# Patient Record
Sex: FEMALE | Race: WHITE | NOT HISPANIC OR LATINO | URBAN - METROPOLITAN AREA
[De-identification: names, ages, dates, MRNs, and addresses within clinical notes are randomized per-mention and may not be internally consistent; named-entity substitution may affect disease eponyms.]

---

## 2017-02-28 ENCOUNTER — TELEPHONE (OUTPATIENT)
Dept: FAMILY MEDICINE | Facility: CLINIC | Age: 32
End: 2017-02-28

## 2017-02-28 NOTE — TELEPHONE ENCOUNTER
Freda reports that she has been having lower left abdominal pain for about a month. Pain score of a 4. Sleeps at night. Frequent urination started last night. Denies fever. Denies pain upon urination. Feels like she is not emptying her bladder completely. Denies lower back pain or side pain. Appointment made for tomorrow morning.   Candelario Garcia RN

## 2017-02-28 NOTE — TELEPHONE ENCOUNTER
Freda states that she has been having ovarian pain x 1 month.  She states that she had a cyst last fall ruputured and it could be something like that ?  She states that she has some concerns as she has been having frequency of urine and a lot more gas.  She has had some changes also to her bowel movements.  Please call and assess. Thank you..Brie Agee

## 2017-03-01 ENCOUNTER — OFFICE VISIT (OUTPATIENT)
Dept: FAMILY MEDICINE | Facility: CLINIC | Age: 32
End: 2017-03-01

## 2017-03-01 VITALS
HEART RATE: 81 BPM | BODY MASS INDEX: 30.66 KG/M2 | HEIGHT: 62 IN | WEIGHT: 166.6 LBS | TEMPERATURE: 98.3 F | SYSTOLIC BLOOD PRESSURE: 115 MMHG | DIASTOLIC BLOOD PRESSURE: 78 MMHG

## 2017-03-01 DIAGNOSIS — R30.0 DYSURIA: ICD-10-CM

## 2017-03-01 DIAGNOSIS — R87.810 CERVICAL HIGH RISK HPV (HUMAN PAPILLOMAVIRUS) TEST POSITIVE: ICD-10-CM

## 2017-03-01 DIAGNOSIS — B96.89 BV (BACTERIAL VAGINOSIS): ICD-10-CM

## 2017-03-01 DIAGNOSIS — K59.00 CONSTIPATION, UNSPECIFIED CONSTIPATION TYPE: ICD-10-CM

## 2017-03-01 DIAGNOSIS — R10.32 LLQ ABDOMINAL PAIN: ICD-10-CM

## 2017-03-01 DIAGNOSIS — R10.2 PELVIC PAIN IN FEMALE: Primary | ICD-10-CM

## 2017-03-01 DIAGNOSIS — N76.0 BV (BACTERIAL VAGINOSIS): ICD-10-CM

## 2017-03-01 DIAGNOSIS — R10.31 ABDOMINAL PAIN, RIGHT LOWER QUADRANT: ICD-10-CM

## 2017-03-01 LAB
ALBUMIN UR-MCNC: NEGATIVE MG/DL
APPEARANCE UR: CLEAR
BACTERIA #/AREA URNS HPF: ABNORMAL /HPF
BILIRUB UR QL STRIP: NEGATIVE
COLOR UR AUTO: YELLOW
GLUCOSE UR STRIP-MCNC: NEGATIVE MG/DL
HGB UR QL STRIP: ABNORMAL
KETONES UR STRIP-MCNC: NEGATIVE MG/DL
LEUKOCYTE ESTERASE UR QL STRIP: ABNORMAL
NITRATE UR QL: NEGATIVE
NON-SQ EPI CELLS #/AREA URNS LPF: ABNORMAL /LPF
PH UR STRIP: 6 PH (ref 5–7)
RBC #/AREA URNS AUTO: ABNORMAL /HPF (ref 0–2)
SP GR UR STRIP: 1.02 (ref 1–1.03)
URN SPEC COLLECT METH UR: ABNORMAL
URNS CMNT MICRO: ABNORMAL
UROBILINOGEN UR STRIP-ACNC: 0.2 EU/DL (ref 0.2–1)
WBC #/AREA URNS AUTO: ABNORMAL /HPF (ref 0–2)

## 2017-03-01 PROCEDURE — 99213 OFFICE O/P EST LOW 20 MIN: CPT | Performed by: PHYSICIAN ASSISTANT

## 2017-03-01 PROCEDURE — 88175 CYTOPATH C/V AUTO FLUID REDO: CPT | Performed by: PHYSICIAN ASSISTANT

## 2017-03-01 PROCEDURE — 87086 URINE CULTURE/COLONY COUNT: CPT | Performed by: PHYSICIAN ASSISTANT

## 2017-03-01 PROCEDURE — 81001 URINALYSIS AUTO W/SCOPE: CPT | Performed by: PHYSICIAN ASSISTANT

## 2017-03-01 PROCEDURE — 87624 HPV HI-RISK TYP POOLED RSLT: CPT | Performed by: PHYSICIAN ASSISTANT

## 2017-03-01 RX ORDER — METRONIDAZOLE 500 MG/1
500 TABLET ORAL 2 TIMES DAILY
Qty: 14 TABLET | Refills: 0 | Status: SHIPPED | OUTPATIENT
Start: 2017-03-01 | End: 2017-07-28

## 2017-03-01 NOTE — MR AVS SNAPSHOT
After Visit Summary   3/1/2017    Freda Hurley    MRN: [id number]           Patient Information     Date Of Birth          1985        Visit Information        Provider Department      3/1/2017 8:20 AM Fang Suarez PA-C [facility]        Today's Diagnoses     Dysuria    -  1    Cervical high risk HPV (human papillomavirus) test positive        Abdominal pain, right lower quadrant        LLQ abdominal pain        Pelvic pain in female        BV (bacterial vaginosis)          Care Instructions    Be seen if symptoms worsen or not improving, urgently if severe abdominal pain / fevers / vomiting / etc    Treat bacterial vaginosis - metronidazole    For constipation:   1. Consume at least 8 glasses of water per day   2. Exercise for at least 30 minutes every day. Regular exercise helps to keep your digestive system active and and healthy and may help with constipation.   3. Increase dietary fiber. Goal of 25 grams per day for women, 35 grams per day for men. If unable to consume 25-35 grams through diet alone consider OTC supplements such as Benefiber, FiberCon, Metamucil, or Citrucel.   4. Recommend taking Miralax (17 grams = 1 scoop). Take once daily, can mix with anything. If you experience increasing bowel movements and diarrhea, decrease to every other day or every 3rd day. Miralax is an osmotic laxative that increases the amount of water secreted by the intestines resulting in softer and easier to pass stools.   5. . If you are still having difficulties with constipation may also add a stool softener to your bowel regimen such a Docusate.           Follow-ups after your visit        Future tests that were ordered for you today     Open Future Orders        Priority Expected Expires Ordered    US Pelvic Complete w Transvaginal Routine 5/30/2017 3/1/2018 3/1/2017            Who to contact     Normal or non-critical lab and imaging results will be communicated to you by Hayley,  "letter or phone within 4 business days after the clinic has received the results. If you do not hear from us within 7 days, please contact the clinic through Narvii or phone. If you have a critical or abnormal lab result, we will notify you by phone as soon as possible.  Submit refill requests through Narvii or call your pharmacy and they will forward the refill request to us. Please allow 3 business days for your refill to be completed.          If you need to speak with a  for additional information , please call: [phone]             Additional Information About Your Visit        Narvii Information     Narvii gives you secure access to your electronic health record. If you see a primary care provider, you can also send messages to your care team and make appointments. If you have questions, please call your primary care clinic.  If you do not have a primary care provider, please call [phone] and they will assist you.        Care EveryWhere ID     This is your Care EveryWhere ID. This could be used by other organizations to access your [facility] medical records  [id number]        Your Vitals Were     Pulse Temperature Height Last Period BMI (Body Mass Index)       81 98.3  F (36.8  C) (Tympanic) 5' 1.5\" (1.562 m) 02/12/2017 30.97 kg/m2        Blood Pressure from Last 3 Encounters:   03/01/17 115/78   09/08/16 109/86   02/06/15 113/55    Weight from Last 3 Encounters:   03/01/17 166 lb 9.6 oz (75.6 kg)   09/08/16 157 lb 12.8 oz (71.6 kg)   02/06/15 140 lb (63.5 kg)              We Performed the Following     *UA reflex to Microscopic     Pap imaged thin layer diagnostic with HPV (select HPV order below)     Urine Culture Aerobic Bacterial     Urine Microscopic          Today's Medication Changes          These changes are accurate as of: 3/1/17  9:37 AM.  If you have any questions, ask your nurse or doctor.               Start taking these medicines.        Dose/Directions    " metroNIDAZOLE 500 MG tablet   Commonly known as:  FLAGYL   Used for:  BV (bacterial vaginosis)   Started by:  Fang Suarez PA-C        Dose:  500 mg   Take 1 tablet (500 mg) by mouth 2 times daily   Quantity:  14 tablet   Refills:  0            Where to get your medicines      These medications were sent to [facility], [place] - [address] [address]  [address] [facility] [place] [address]     Phone:  [phone]     metroNIDAZOLE 500 MG tablet                Primary Care Provider    None Specified       No primary provider on file.        Thank you!     Thank you for choosing [facility]  for your care. Our goal is always to provide you with excellent care. Hearing back from our patients is one way we can continue to improve our services. Please take a few minutes to complete the written survey that you may receive in the mail after your visit with us. Thank you!             Your Updated Medication List - Protect others around you: Learn how to safely use, store and throw away your medicines at www.disposemymeds.org.          This list is accurate as of: 3/1/17  9:37 AM.  Always use your most recent med list.                   Brand Name Dispense Instructions for use    metroNIDAZOLE 500 MG tablet    FLAGYL    14 tablet    Take 1 tablet (500 mg) by mouth 2 times daily       MULTI-VITAMIN PO      1 tablet twice daily       OMEGA 3 PO      daily       VITAMIN B 12 PO      daily       VITAMIN D PO      daily

## 2017-03-01 NOTE — NURSING NOTE
"Chief Complaint   Patient presents with     Abdominal Pain     UTI     frequeny urination, not able to empty bladder completely       Initial /78 (BP Location: Right arm, Patient Position: Chair, Cuff Size: Adult Regular)  Pulse 81  Temp 98.3  F (36.8  C) (Tympanic)  Ht 5' 1.5\" (1.562 m)  Wt 166 lb 9.6 oz (75.6 kg)  LMP 02/12/2017  BMI 30.97 kg/m2 Estimated body mass index is 30.97 kg/(m^2) as calculated from the following:    Height as of this encounter: 5' 1.5\" (1.562 m).    Weight as of this encounter: 166 lb 9.6 oz (75.6 kg).  Medication Reconciliation: erwin Benedict CMA  "

## 2017-03-01 NOTE — PATIENT INSTRUCTIONS
Be seen if symptoms worsen or not improving, urgently if severe abdominal pain / fevers / vomiting / etc    Treat bacterial vaginosis - metronidazole    For constipation:   1. Consume at least 8 glasses of water per day   2. Exercise for at least 30 minutes every day. Regular exercise helps to keep your digestive system active and and healthy and may help with constipation.   3. Increase dietary fiber. Goal of 25 grams per day for women, 35 grams per day for men. If unable to consume 25-35 grams through diet alone consider OTC supplements such as Benefiber, FiberCon, Metamucil, or Citrucel.   4. Recommend taking Miralax (17 grams = 1 scoop). Take once daily, can mix with anything. If you experience increasing bowel movements and diarrhea, decrease to every other day or every 3rd day. Miralax is an osmotic laxative that increases the amount of water secreted by the intestines resulting in softer and easier to pass stools.   5. . If you are still having difficulties with constipation may also add a stool softener to your bowel regimen such a Docusate.

## 2017-03-01 NOTE — PROGRESS NOTES
"SUBJECTIVE:                                                    Freda Hurley is a 31 year old female who presents to clinic today for the following health issues:    URINARY TRACT SYMPTOMS     Onset: about 1 month, feels as though she can not empty completely     Description:   Painful urination (Dysuria): no   Blood in urine (Hematuria): no   Delay in urine (Hesitency): no     Intensity: moderate    Progression of Symptoms:  worsening    Accompanying Signs & Symptoms:  Fever/chills: no   Flank pain no   Nausea and vomiting: no   Any vaginal symptoms: none  Abdominal/Pelvic Pain: YES- LLQ, some occasionally rlq   History:   History of frequent UTI's: no   History of kidney stones: no   Sexually Active: YES  Possibility of pregnancy: No    Precipitating factors:   None         Therapies Tried and outcome: None    Last few days: Urinary frequency, and feels she can't empty completely. Also thinks it was because she was drinking tea    Has had constipation in past. No BM yesterday or today. The day before a little loose/soft (not diarrhea).   Did start probiotic. More gassy. Has had that recently where she won't go a few days  Has aching pain, sometimes sharp, and \"twinges\" past month. Whole lower sides. Switches areas.   A little sharper pain with period, Had ruptured ovarian cyst last year right side  Has been diagnosed with IBS-C a long time ago    She has not had sexual activity in 4 months - does not want pregnancy test  She does not want abdominal x-ray, lab tests    Problem list and histories reviewed & adjusted, as indicated.  Additional history: none    Patient Active Problem List   Diagnosis     Atopic rhinitis     Seasonal allergic rhinitis     Conjunctivitis, allergic     CARDIOVASCULAR SCREENING; LDL GOAL LESS THAN 160     Cervical high risk HPV (human papillomavirus) test positive     Past Surgical History   Procedure Laterality Date     No history of surgery         Social History   Substance Use " "Topics     Smoking status: Never Smoker     Smokeless tobacco: Never Used     Alcohol use Yes     Family History   Problem Relation Age of Onset     Hypertension Maternal Grandmother      Breast Cancer Maternal Grandmother      CEREBROVASCULAR DISEASE Paternal Grandfather      C.A.D. Paternal Grandfather      Allergies Father      DIABETES No family hx of      Cancer - colorectal No family hx of      Prostate Cancer No family hx of          Labs reviewed in EPIC    ROS:  Other than noted above, general, HEENT, respiratory, cardiac and gastrointestinal systems are negative.     OBJECTIVE:                                                    /78 (BP Location: Right arm, Patient Position: Chair, Cuff Size: Adult Regular)  Pulse 81  Temp 98.3  F (36.8  C) (Tympanic)  Ht 5' 1.5\" (1.562 m)  Wt 166 lb 9.6 oz (75.6 kg)  LMP 02/12/2017  BMI 30.97 kg/m2 Body mass index is 30.97 kg/(m^2).   GENERAL: healthy, alert, well nourished, well hydrated, no distress  RESP: lungs clear to auscultation - no rales, no rhonchi, no wheezes  CV: regular rates and rhythm, normal S1 S2, no S3 or S4 and no murmur, no click or rub -  ABDOMEN: soft, no  hepatosplenomegaly, no masses, normal bowel sounds  No rebound tenderness or guarding. Heel jar negative. POSITIVE lower abdominal tenderness generalized  BACK: no CVA tenderness, no paralumbar tenderness   (female): normal cervix, adnexae, and uterus without masses POSITIVE whitish creamy discharge        ASSESSMENT/PLAN:                                                      ASSESSMENT/PLAN:      ICD-10-CM    1. Pelvic pain in female R10.2 US Pelvic Complete w Transvaginal   2. BV (bacterial vaginosis) N76.0 metroNIDAZOLE (FLAGYL) 500 MG tablet    B96.89    3. Abdominal pain, right lower quadrant R10.31 US Pelvic Complete w Transvaginal   4. LLQ abdominal pain R10.32 US Pelvic Complete w Transvaginal   5. Constipation, unspecified constipation type K59.00    6. Cervical high risk HPV (human " papillomavirus) test positive R87.810 Pap imaged thin layer diagnostic with HPV (select HPV order below)   7. Dysuria R30.0 *UA reflex to Microscopic     Urine Microscopic     Urine Culture Aerobic Bacterial     Likely multifactorial, including hard stools/constipation and BV. Follow up if worsening or not improving, Red flag signs to be seen urgently were discussed.    Patient Instructions   Be seen if symptoms worsen or not improving, urgently if severe abdominal pain / fevers / vomiting / etc    Treat bacterial vaginosis - metronidazole    For constipation:   1. Consume at least 8 glasses of water per day   2. Exercise for at least 30 minutes every day. Regular exercise helps to keep your digestive system active and and healthy and may help with constipation.   3. Increase dietary fiber. Goal of 25 grams per day for women, 35 grams per day for men. If unable to consume 25-35 grams through diet alone consider OTC supplements such as Benefiber, FiberCon, Metamucil, or Citrucel.   4. Recommend taking Miralax (17 grams = 1 scoop). Take once daily, can mix with anything. If you experience increasing bowel movements and diarrhea, decrease to every other day or every 3rd day. Miralax is an osmotic laxative that increases the amount of water secreted by the intestines resulting in softer and easier to pass stools.   5. . If you are still having difficulties with constipation may also add a stool softener to your bowel regimen such a Docusate.     Fang Suarez PA-C   [facility]

## 2017-03-03 LAB
BACTERIA SPEC CULT: NORMAL
COPATH REPORT: NORMAL
MICRO REPORT STATUS: NORMAL
PAP: NORMAL
SPECIMEN SOURCE: NORMAL

## 2017-03-07 LAB
FINAL DIAGNOSIS: NORMAL
HPV HR 12 DNA CVX QL NAA+PROBE: NEGATIVE
HPV16 DNA SPEC QL NAA+PROBE: NEGATIVE
HPV18 DNA SPEC QL NAA+PROBE: NEGATIVE
SPECIMEN DESCRIPTION: NORMAL

## 2017-07-28 ENCOUNTER — OFFICE VISIT (OUTPATIENT)
Dept: FAMILY MEDICINE | Facility: CLINIC | Age: 32
End: 2017-07-28
Payer: COMMERCIAL

## 2017-07-28 VITALS
SYSTOLIC BLOOD PRESSURE: 114 MMHG | HEIGHT: 62 IN | DIASTOLIC BLOOD PRESSURE: 74 MMHG | TEMPERATURE: 98.4 F | WEIGHT: 166.5 LBS | HEART RATE: 85 BPM | BODY MASS INDEX: 30.64 KG/M2

## 2017-07-28 DIAGNOSIS — R79.82 ELEVATED C-REACTIVE PROTEIN: ICD-10-CM

## 2017-07-28 DIAGNOSIS — Z13.220 LIPID SCREENING: ICD-10-CM

## 2017-07-28 DIAGNOSIS — F41.8 DEPRESSION WITH ANXIETY: Primary | ICD-10-CM

## 2017-07-28 DIAGNOSIS — R53.83 OTHER FATIGUE: ICD-10-CM

## 2017-07-28 DIAGNOSIS — E55.9 VITAMIN D DEFICIENCY: ICD-10-CM

## 2017-07-28 LAB
ALBUMIN SERPL-MCNC: 3.9 G/DL (ref 3.4–5)
ALP SERPL-CCNC: 68 U/L (ref 40–150)
ALT SERPL W P-5'-P-CCNC: 30 U/L (ref 0–50)
ANION GAP SERPL CALCULATED.3IONS-SCNC: 7 MMOL/L (ref 3–14)
AST SERPL W P-5'-P-CCNC: 19 U/L (ref 0–45)
BASOPHILS # BLD AUTO: 0 10E9/L (ref 0–0.2)
BASOPHILS NFR BLD AUTO: 0.2 %
BILIRUB SERPL-MCNC: 0.3 MG/DL (ref 0.2–1.3)
BUN SERPL-MCNC: 9 MG/DL (ref 7–30)
CALCIUM SERPL-MCNC: 8.8 MG/DL (ref 8.5–10.1)
CHLORIDE SERPL-SCNC: 103 MMOL/L (ref 94–109)
CO2 SERPL-SCNC: 25 MMOL/L (ref 20–32)
CREAT SERPL-MCNC: 0.68 MG/DL (ref 0.52–1.04)
CRP SERPL-MCNC: 11.9 MG/L (ref 0–8)
DEPRECATED CALCIDIOL+CALCIFEROL SERPL-MC: 30 UG/L (ref 20–75)
DIFFERENTIAL METHOD BLD: NORMAL
EOSINOPHIL # BLD AUTO: 0.2 10E9/L (ref 0–0.7)
EOSINOPHIL NFR BLD AUTO: 1.9 %
ERYTHROCYTE [DISTWIDTH] IN BLOOD BY AUTOMATED COUNT: 12.8 % (ref 10–15)
FERRITIN SERPL-MCNC: 77 NG/ML (ref 12–150)
GFR SERPL CREATININE-BSD FRML MDRD: NORMAL ML/MIN/1.7M2
GLUCOSE SERPL-MCNC: 92 MG/DL (ref 70–99)
HCT VFR BLD AUTO: 41.4 % (ref 35–47)
HGB BLD-MCNC: 14.1 G/DL (ref 11.7–15.7)
LDLC SERPL DIRECT ASSAY-MCNC: 81 MG/DL
LYMPHOCYTES # BLD AUTO: 1.9 10E9/L (ref 0.8–5.3)
LYMPHOCYTES NFR BLD AUTO: 20 %
MCH RBC QN AUTO: 29.9 PG (ref 26.5–33)
MCHC RBC AUTO-ENTMCNC: 34.1 G/DL (ref 31.5–36.5)
MCV RBC AUTO: 88 FL (ref 78–100)
MONOCYTES # BLD AUTO: 0.7 10E9/L (ref 0–1.3)
MONOCYTES NFR BLD AUTO: 7.1 %
NEUTROPHILS # BLD AUTO: 6.8 10E9/L (ref 1.6–8.3)
NEUTROPHILS NFR BLD AUTO: 70.8 %
PLATELET # BLD AUTO: 282 10E9/L (ref 150–450)
POTASSIUM SERPL-SCNC: 3.5 MMOL/L (ref 3.4–5.3)
PROT SERPL-MCNC: 7.8 G/DL (ref 6.8–8.8)
RBC # BLD AUTO: 4.71 10E12/L (ref 3.8–5.2)
SODIUM SERPL-SCNC: 135 MMOL/L (ref 133–144)
TSH SERPL DL<=0.005 MIU/L-ACNC: 1.87 MU/L (ref 0.4–4)
WBC # BLD AUTO: 9.6 10E9/L (ref 4–11)

## 2017-07-28 PROCEDURE — 82306 VITAMIN D 25 HYDROXY: CPT | Performed by: PHYSICIAN ASSISTANT

## 2017-07-28 PROCEDURE — 99215 OFFICE O/P EST HI 40 MIN: CPT | Performed by: PHYSICIAN ASSISTANT

## 2017-07-28 PROCEDURE — 36415 COLL VENOUS BLD VENIPUNCTURE: CPT | Performed by: PHYSICIAN ASSISTANT

## 2017-07-28 PROCEDURE — 82728 ASSAY OF FERRITIN: CPT | Performed by: PHYSICIAN ASSISTANT

## 2017-07-28 PROCEDURE — 86140 C-REACTIVE PROTEIN: CPT | Performed by: PHYSICIAN ASSISTANT

## 2017-07-28 PROCEDURE — 80050 GENERAL HEALTH PANEL: CPT | Performed by: PHYSICIAN ASSISTANT

## 2017-07-28 PROCEDURE — 83721 ASSAY OF BLOOD LIPOPROTEIN: CPT | Performed by: PHYSICIAN ASSISTANT

## 2017-07-28 RX ORDER — LORAZEPAM 0.5 MG/1
0.5 TABLET ORAL EVERY 8 HOURS PRN
Qty: 10 TABLET | Refills: 0 | Status: SHIPPED | OUTPATIENT
Start: 2017-07-28 | End: 2020-04-27

## 2017-07-28 RX ORDER — ESCITALOPRAM OXALATE 20 MG/1
TABLET ORAL
Qty: 30 TABLET | Refills: 1 | Status: SHIPPED | OUTPATIENT
Start: 2017-07-28 | End: 2017-10-02

## 2017-07-28 ASSESSMENT — ANXIETY QUESTIONNAIRES
7. FEELING AFRAID AS IF SOMETHING AWFUL MIGHT HAPPEN: NOT AT ALL
2. NOT BEING ABLE TO STOP OR CONTROL WORRYING: NEARLY EVERY DAY
3. WORRYING TOO MUCH ABOUT DIFFERENT THINGS: NEARLY EVERY DAY
GAD7 TOTAL SCORE: 16
6. BECOMING EASILY ANNOYED OR IRRITABLE: NEARLY EVERY DAY
1. FEELING NERVOUS, ANXIOUS, OR ON EDGE: NEARLY EVERY DAY
5. BEING SO RESTLESS THAT IT IS HARD TO SIT STILL: SEVERAL DAYS

## 2017-07-28 ASSESSMENT — PATIENT HEALTH QUESTIONNAIRE - PHQ9: 5. POOR APPETITE OR OVEREATING: NEARLY EVERY DAY

## 2017-07-28 NOTE — NURSING NOTE
"Chief Complaint   Patient presents with     Depression Screening       Initial /74 (BP Location: Right arm, Patient Position: Sitting, Cuff Size: Adult Regular)  Pulse 85  Temp 98.4  F (36.9  C) (Tympanic)  Ht 5' 1.5\" (1.562 m)  Wt 166 lb 8 oz (75.5 kg)  BMI 30.95 kg/m2 Estimated body mass index is 30.95 kg/(m^2) as calculated from the following:    Height as of this encounter: 5' 1.5\" (1.562 m).    Weight as of this encounter: 166 lb 8 oz (75.5 kg).  Medication Reconciliation: complete   Kait Molina CMA  "

## 2017-07-28 NOTE — LETTER
My Depression Action Plan  Name: Freda Hurley   Date of Birth 1985  Date: 7/28/2017    My doctor: No primary care provider on file.   My clinic: [address] [address] [phone]          GREEN    ZONE   Good Control    What it looks like:     Things are going generally well. You have normal up s and down s. You may even feel depressed from time to time, but bad moods usually last less than a day.   What you need to do:  1. Continue to care for yourself (see self care plan)  2. Check your depression survival kit and update it as needed  3. Follow your physician s recommendations including any medication.  4. Do not stop taking medication unless you consult with your physician first.           YELLOW         ZONE Getting Worse    What it looks like:     Depression is starting to interfere with your life.     It may be hard to get out of bed; you may be starting to isolate yourself from others.    Symptoms of depression are starting to last most all day and this has happened for several days.     You may have suicidal thoughts but they are not constant.   What you need to do:     1. Call your care team, your response to treatment will improve if you keep your care team informed of your progress. Yellow periods are signs an adjustment may need to be made.     2. Continue your self-care, even if you have to fake it!    3. Talk to someone in your support network    4. Open up your depression survival kit           RED    ZONE Medical Alert - Get Help    What it looks like:     Depression is seriously interfering with your life.     You may experience these or other symptoms: You can t get out of bed most days, can t work or engage in other necessary activities, you have trouble taking care of basic hygiene, or basic responsibilities, thoughts of suicide or death that will not go away, self-injurious behavior.     What you need to do:  1. Call your care  team and request a same-day appointment. If they are not available (weekends or after hours) call your local crisis line, emergency room or 911.      Electronically signed by: Fang Suarez, July 28, 2017    Depression Self Care Plan / Survival Kit    Self-Care for Depression  Here s the deal. Your body and mind are really not as separate as most people think.  What you do and think affects how you feel and how you feel influences what you do and think. This means if you do things that people who feel good do, it will help you feel better.  Sometimes this is all it takes.  There is also a place for medication and therapy depending on how severe your depression is, so be sure to consult with your medical provider and/ or Behavioral Health Consultant if your symptoms are worsening or not improving.     In order to better manage my stress, I will:    Exercise  Get some form of exercise, every day. This will help reduce pain and release endorphins, the  feel good  chemicals in your brain. This is almost as good as taking antidepressants!  This is not the same as joining a gym and then never going! (they count on that by the way ) It can be as simple as just going for a walk or doing some gardening, anything that will get you moving.      Hygiene   Maintain good hygiene (Get out of bed in the morning, Make your bed, Brush your teeth, Take a shower, and Get dressed like you were going to work, even if you are unemployed).  If your clothes don't fit try to get ones that do.    Diet  I will strive to eat foods that are good for me, drink plenty of water, and avoid excessive sugar, caffeine, alcohol, and other mood-altering substances.  Some foods that are helpful in depression are: complex carbohydrates, B vitamins, flaxseed, fish or fish oil, fresh fruits and vegetables.    Psychotherapy  I agree to participate in Individual Therapy (if recommended).    Medication  If prescribed medications, I agree to take them.   Missing doses can result in serious side effects.  I understand that drinking alcohol, or other illicit drug use, may cause potential side effects.  I will not stop my medication abruptly without first discussing it with my provider.    Staying Connected With Others  I will stay in touch with my friends, family members, and my primary care provider/team.    Use your imagination  Be creative.  We all have a creative side; it doesn t matter if it s oil painting, sand castles, or mud pies! This will also kick up the endorphins.    Witness Beauty  (AKA stop and smell the roses) Take a look outside, even in mid-winter. Notice colors, textures. Watch the squirrels and birds.     Service to others  Be of service to others.  There is always someone else in need.  By helping others we can  get out of ourselves  and remember the really important things.  This also provides opportunities for practicing all the other parts of the program.    Humor  Laugh and be silly!  Adjust your TV habits for less news and crime-drama and more comedy.    Control your stress  Try breathing deep, massage therapy, biofeedback, and meditation. Find time to relax each day.     My support system    Clinic Contact:  Phone number:    Contact 1:  Phone number:    Contact 2:  Phone number:    Buddhist/:  Phone number:    Therapist:  Phone number:    Local crisis center:    Phone number:    Other community support:  Phone number:
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
## 2017-07-28 NOTE — MR AVS SNAPSHOT
After Visit Summary   7/28/2017    Freda Hurley    MRN: [id number]           Patient Information     Date Of Birth          1985        Visit Information        Provider Department      7/28/2017 8:20 AM Fang Suarez PA-C [facility]        Today's Diagnoses     Other fatigue    -  1    Vitamin D deficiency        Depression with anxiety          Care Instructions    Start lexapro - 10 mg for 1-2 weeks then increase to 20 mg  Ativan sparingly as needed. Can make you groggy.  Follow up in 1 month, sooner if needed    When you're feeling overwhelmed:  1. Deep breathing from your diaphragm. Put your hand over your belly if that helps. Breathe in through your nose, hold, out through your mouth  2. Muscle tension/relaxation - squeeze your fists/forearms then release them to release tension  3. Grounding yourself. 5-4-3-2-1. 5- things you see, 4- things you feel, 3- things you hear, 2- things you taste/smell, 1- how am I feeling? What's one good thing about myself?  4. Thoughts turn to feelings turn to actions. You'll notice this when a negative thought can make you feel anxious or down, and in turn you act differently.  5. So turn around the negative thought by counteracting it with a positive one. What can the positive side of you say to that negative thought?    Visit www.helpguide.org for stress tools  Visit www.stressC3 Energyy.Sano for guided meditation           Follow-ups after your visit        Who to contact     Normal or non-critical lab and imaging results will be communicated to you by Unypehart, letter or phone within 4 business days after the clinic has received the results. If you do not hear from us within 7 days, please contact the clinic through Zillabytet or phone. If you have a critical or abnormal lab result, we will notify you by phone as soon as possible.  Submit refill requests through Malesbanget or call your pharmacy and they will forward the refill request to us. Please  "allow 3 business days for your refill to be completed.          If you need to speak with a  for additional information , please call: [phone]             Additional Information About Your Visit        Striped Sailhart Information     [employer] gives you secure access to your electronic health record. If you see a primary care provider, you can also send messages to your care team and make appointments. If you have questions, please call your primary care clinic.  If you do not have a primary care provider, please call [phone] and they will assist you.        Care EveryWhere ID     This is your Care EveryWhere ID. This could be used by other organizations to access your [employer] medical records  [id number]        Your Vitals Were     Pulse Temperature Height BMI (Body Mass Index)          85 98.4  F (36.9  C) (Tympanic) 5' 1.5\" (1.562 m) 30.95 kg/m2         Blood Pressure from Last 3 Encounters:   07/28/17 114/74   03/01/17 115/78   09/08/16 109/86    Weight from Last 3 Encounters:   07/28/17 166 lb 8 oz (75.5 kg)   03/01/17 166 lb 9.6 oz (75.6 kg)   09/08/16 157 lb 12.8 oz (71.6 kg)              We Performed the Following     CBC with platelets differential     Comprehensive metabolic panel     CRP inflammation     Ferritin     TSH with free T4 reflex     Vitamin D Deficiency          Today's Medication Changes          These changes are accurate as of: 7/28/17  9:42 AM.  If you have any questions, ask your nurse or doctor.               Start taking these medicines.        Dose/Directions    escitalopram 20 MG tablet   Commonly known as:  LEXAPRO   Used for:  Depression with anxiety   Started by:  Fang Suarez PA-C        Take 1/2 tablet (10 mg) for 1-2 weeks, then increase to 1 tablet orally daily   Quantity:  30 tablet   Refills:  1       LORazepam 0.5 MG tablet   Commonly known as:  ATIVAN   Used for:  Depression with anxiety   Started by:  Fang Suarez PA-C        Dose:  0.5 mg "   Take 1 tablet (0.5 mg) by mouth every 8 hours as needed for anxiety   Quantity:  10 tablet   Refills:  0            Where to get your medicines      These medications were sent to [facility] [place], [place] - [address] [facility] [address]  [address] [facility] [place] [address]     Phone:  [phone]     escitalopram 20 MG tablet         Some of these will need a paper prescription and others can be bought over the counter.  Ask your nurse if you have questions.     Bring a paper prescription for each of these medications     LORazepam 0.5 MG tablet                Primary Care Provider    None Specified       No primary provider on file.        Equal Access to Services     [facility]: Hadcelia infante Soolena, waaxjoann lucas, jeremy blockalluis espinosa, raúl jade . So [facility] [phone].    ATENCIÓN: Si habla español, tiene a jacome disposición servicios gratuitos de asistencia lingüística. Llame al [phone].    We comply with applicable federal civil rights laws and [place] laws. We do not discriminate on the basis of race, color, national origin, age, disability sex, sexual orientation or gender identity.            Thank you!     Thank you for choosing [facility]  for your care. Our goal is always to provide you with excellent care. Hearing back from our patients is one way we can continue to improve our services. Please take a few minutes to complete the written survey that you may receive in the mail after your visit with us. Thank you!             Your Updated Medication List - Protect others around you: Learn how to safely use, store and throw away your medicines at www.disposemymeds.org.          This list is accurate as of: 7/28/17  9:42 AM.  Always use your most recent med list.                   Brand Name Dispense Instructions for use Diagnosis    escitalopram 20 MG tablet    LEXAPRO    30 tablet    Take 1/2 tablet (10 mg) for 1-2 weeks, then  increase to 1 tablet orally daily    Depression with anxiety       LORazepam 0.5 MG tablet    ATIVAN    10 tablet    Take 1 tablet (0.5 mg) by mouth every 8 hours as needed for anxiety    Depression with anxiety       MULTI-VITAMIN PO      1 tablet twice daily        OMEGA 3 PO      daily        UNKNOWN TO PATIENT      Mariann        VITAMIN B 12 PO      daily        VITAMIN D PO      daily

## 2017-07-28 NOTE — LETTER
[facility]  [address] [address]  [phone]        August 7, 2018    Freda Hurley  [address] [address]              Dear Freda Hurley    This is to remind you that your lab is due.    You may call our office at [phone] to schedule an appointment.    Please disregard this notice if you have already had your labs drawn or made an appointment.        Sincerely,        Fang Suarez PA-C

## 2017-07-28 NOTE — PATIENT INSTRUCTIONS
Start lexapro - 10 mg for 1-2 weeks then increase to 20 mg  Ativan sparingly as needed. Can make you groggy.  Follow up in 1 month, sooner if needed    When you're feeling overwhelmed:  1. Deep breathing from your diaphragm. Put your hand over your belly if that helps. Breathe in through your nose, hold, out through your mouth  2. Muscle tension/relaxation - squeeze your fists/forearms then release them to release tension  3. Grounding yourself. 5-4-3-2-1. 5- things you see, 4- things you feel, 3- things you hear, 2- things you taste/smell, 1- how am I feeling? What's one good thing about myself?  4. Thoughts turn to feelings turn to actions. You'll notice this when a negative thought can make you feel anxious or down, and in turn you act differently.  5. So turn around the negative thought by counteracting it with a positive one. What can the positive side of you say to that negative thought?    Visit www.helpguide.org for stress tools  Visit www.stressremedy.Cass Art for guided meditation

## 2017-07-29 ASSESSMENT — PATIENT HEALTH QUESTIONNAIRE - PHQ9: SUM OF ALL RESPONSES TO PHQ QUESTIONS 1-9: 14

## 2017-07-29 ASSESSMENT — ANXIETY QUESTIONNAIRES: GAD7 TOTAL SCORE: 16

## 2017-08-02 ENCOUNTER — MYC MEDICAL ADVICE (OUTPATIENT)
Dept: FAMILY MEDICINE | Facility: CLINIC | Age: 32
End: 2017-08-02

## 2017-10-02 ENCOUNTER — VIRTUAL VISIT (OUTPATIENT)
Dept: FAMILY MEDICINE | Facility: CLINIC | Age: 32
End: 2017-10-02
Payer: COMMERCIAL

## 2017-10-02 DIAGNOSIS — F41.8 DEPRESSION WITH ANXIETY: Primary | ICD-10-CM

## 2017-10-02 PROCEDURE — 99442 ZZC PHYSICIAN TELEPHONE EVALUATION 11-20 MIN: CPT | Performed by: PHYSICIAN ASSISTANT

## 2017-10-02 RX ORDER — FLUOXETINE 10 MG/1
10 CAPSULE ORAL DAILY
Qty: 60 CAPSULE | Refills: 1 | Status: SHIPPED | OUTPATIENT
Start: 2017-10-02 | End: 2020-04-27

## 2017-10-02 NOTE — PROGRESS NOTES
"Freda Hurley is a 32 year old female who is being evaluated via a telephone visit.      The patient has been notified of following:     \"This telephone visit will be conducted via a call between you and your physician/provider. We have found that certain health care needs can be provided without the need for a physical exam.  This service lets us provide the care you need with a short phone conversation.  If a prescription is necessary we can send it directly to your pharmacy.  If lab work is needed we can place an order for that and you can then stop by our lab to have the test done at a later time.    We will bill your insurance company for this service.  Please check with your medical insurance if this type of visit is covered. You may be responsible for the cost of this type of visit if insurance coverage is denied.  The typical cost is $30 (10min), $59 (11-20min) and $85 (21-30min).  Most often these visits are shorter than 10 minutes.    If during the course of the call the physician/provider feels a telephone visit is not appropriate, you will not be charged for this service.\"       Consent has been obtained for this service by 2 care team members: yes. See the scanned image in the medical record.    Freda Hurley complains of  Recheck Medication      I have reviewed and updated the patient's Past Medical History, Social History, Family History and Medication List.    ALLERGIES  Cat hair [cats]; Dust mites; Nkda [no known drug allergies]; Seasonal allergies; and Sulfa drugs    Fang Mishra CMA      Additional provider notes: Depression and Anxiety Medication review-Lexapro-taking 20 mg daily. Since last visit she has stopped taking the lexapro 2 weeks ago. Reason was that it is not helping her symptoms. Would like to try a different medication at this time.    She was taking lexapro 20 mg for a month. She is not in school right now, which helps stress. However she goes back to school in " "November.  She has been in therapy  Some family members are on prozac, works well for them - dad and sister  She has used 1 tablet of ativan recently. It did work.    Elevated CRP in July. She was tested for RF in 2014. Dad has RA  With fibro: \"random bouts of fatigue\" and sore arms/muscles \"tender spots\". At rheum visit, she had trigger points and high CRP. She doesn't have joint pain, more muscle. Her symptoms are currently \"irritating\"  She would like to just recheck CRP at this point as lab visit          Assessment/Plan:  ASSESSMENT/PLAN:      ICD-10-CM    1. Depression with anxiety F41.8 FLUoxetine (PROZAC) 10 MG capsule     Will switch to prozac, discussed risks/benefits/side effects. She feels she may do well with lower dose  She will telephone/e-visit next month. Will be difficult for her to come in with schedule    I have reviewed the note as documented above.  This accurately captures the substance of my conversation with the patientFreda    Total time of call between patient and provider was 15 minutes   "

## 2017-10-02 NOTE — MR AVS SNAPSHOT
After Visit Summary   10/2/2017    Freda Hurley    MRN: [id number]           Patient Information     Date Of Birth          1985        Visit Information        Provider Department      10/2/2017 3:20 PM Fang Suarez PA-C [facility]        Today's Diagnoses     Depression with anxiety    -  1       Follow-ups after your visit        Who to contact     Normal or non-critical lab and imaging results will be communicated to you by BuildingOpshart, letter or phone within 4 business days after the clinic has received the results. If you do not hear from us within 7 days, please contact the clinic through BuildingOpshart or phone. If you have a critical or abnormal lab result, we will notify you by phone as soon as possible.  Submit refill requests through [employer] or call your pharmacy and they will forward the refill request to us. Please allow 3 business days for your refill to be completed.          If you need to speak with a  for additional information , please call: [phone]             Additional Information About Your Visit        [employer] Information     [employer] gives you secure access to your electronic health record. If you see a primary care provider, you can also send messages to your care team and make appointments. If you have questions, please call your primary care clinic.  If you do not have a primary care provider, please call [phone] and they will assist you.        Care EveryWhere ID     This is your Care EveryWhere ID. This could be used by other organizations to access your [place] medical records  [id number]         Blood Pressure from Last 3 Encounters:   07/28/17 114/74   03/01/17 115/78   09/08/16 109/86    Weight from Last 3 Encounters:   07/28/17 166 lb 8 oz (75.5 kg)   03/01/17 166 lb 9.6 oz (75.6 kg)   09/08/16 157 lb 12.8 oz (71.6 kg)              Today, you had the following     No orders found for display         Today's Medication  Changes          These changes are accurate as of: 10/2/17  3:43 PM.  If you have any questions, ask your nurse or doctor.               Start taking these medicines.        Dose/Directions    FLUoxetine 10 MG capsule   Commonly known as:  PROzac   Used for:  Depression with anxiety   Started by:  Fang Suarez PA-C        Dose:  10 mg   Take 1 capsule (10 mg) by mouth daily For 1-2 weeks then increase to 2 capsules (20 mg)   Quantity:  60 capsule   Refills:  1         Stop taking these medicines if you haven't already. Please contact your care team if you have questions.     escitalopram 20 MG tablet   Commonly known as:  LEXAPRO   Stopped by:  Fang Suarez PA-C                Where to get your medicines      These medications were sent to [id number] - [place], [place] [address] [address] [address] [place] [address]    Hours:  24-hours Phone:  [phone]     FLUoxetine 10 MG capsule                Primary Care Provider    None Specified       No primary provider on file.        Equal Access to Services     [facility]: Hadii giovani leija hadasho Soomaali, waaxda luqadaha, qaybta kaalmada adeegyajoann, raúl jade . So [facility] [phone].    ATENCIÓN: Si habla español, tiene a jacome disposición servicios gratuitos de asistencia lingüística. [facility] [phone].    We comply with applicable federal civil rights laws and [place] laws. We do not discriminate on the basis of race, color, national origin, age, disability, sex, sexual orientation, or gender identity.            Thank you!     Thank you for choosing [facility]  for your care. Our goal is always to provide you with excellent care. Hearing back from our patients is one way we can continue to improve our services. Please take a few minutes to complete the written survey that you may receive in the mail after your visit with us. Thank you!              Your Updated Medication List - Protect others around you: Learn how to safely use, store and throw away your medicines at www.disposemymeds.org.          This list is accurate as of: 10/2/17  3:43 PM.  Always use your most recent med list.                   Brand Name Dispense Instructions for use Diagnosis    FLUoxetine 10 MG capsule    PROzac    60 capsule    Take 1 capsule (10 mg) by mouth daily For 1-2 weeks then increase to 2 capsules (20 mg)    Depression with anxiety       LORazepam 0.5 MG tablet    ATIVAN    10 tablet    Take 1 tablet (0.5 mg) by mouth every 8 hours as needed for anxiety    Depression with anxiety       MULTI-VITAMIN PO      1 tablet twice daily        OMEGA 3 PO      daily        UNKNOWN TO PATIENT      Mariann        VITAMIN B 12 PO      daily        VITAMIN D PO      daily

## 2018-09-13 ENCOUNTER — TELEPHONE (OUTPATIENT)
Dept: LAB | Facility: CLINIC | Age: 33
End: 2018-09-13

## 2019-11-06 ENCOUNTER — HEALTH MAINTENANCE LETTER (OUTPATIENT)
Age: 34
End: 2019-11-06

## 2020-04-27 ENCOUNTER — OFFICE VISIT (OUTPATIENT)
Dept: FAMILY MEDICINE | Facility: CLINIC | Age: 35
End: 2020-04-27
Payer: COMMERCIAL

## 2020-04-27 DIAGNOSIS — J30.1 SEASONAL ALLERGIC RHINITIS DUE TO POLLEN: Primary | ICD-10-CM

## 2020-04-27 PROCEDURE — 99213 OFFICE O/P EST LOW 20 MIN: CPT | Mod: 95 | Performed by: PHYSICIAN ASSISTANT

## 2020-04-27 RX ORDER — MULTIVITAMIN WITH IRON
1 TABLET ORAL DAILY
COMMUNITY

## 2020-04-27 RX ORDER — DESLORATADINE 5 MG/1
5 TABLET ORAL DAILY
Qty: 90 TABLET | Refills: 1 | Status: SHIPPED | OUTPATIENT
Start: 2020-04-27 | End: 2020-08-25

## 2020-04-27 RX ORDER — FLUTICASONE PROPIONATE 50 MCG
1 SPRAY, SUSPENSION (ML) NASAL DAILY
Qty: 16 G | Refills: 3 | Status: SHIPPED | OUTPATIENT
Start: 2020-04-27 | End: 2021-07-05

## 2020-04-27 NOTE — PROGRESS NOTES
"Freda Hurley is a 34 year old female who is being evaluated via a billable telephone visit.      The patient has been notified of following:     \"This telephone visit will be conducted via a call between you and your physician/provider. We have found that certain health care needs can be provided without the need for a physical exam.  This service lets us provide the care you need with a short phone conversation.  If a prescription is necessary we can send it directly to your pharmacy.  If lab work is needed we can place an order for that and you can then stop by our lab to have the test done at a later time.    Telephone visits are billed at different rates depending on your insurance coverage. During this emergency period, for some insurers they may be billed the same as an in-person visit.  Please reach out to your insurance provider with any questions.    If during the course of the call the physician/provider feels a telephone visit is not appropriate, you will not be charged for this service.\"    Patient has given verbal consent for Telephone visit?  Yes    How would you like to obtain your AVS? MyChart    Subjective     Freda Hurley is a 34 year old female who presents to clinic today for the following health issues:    HPI  ENT Symptoms             Symptoms: cc Present Absent Comment   Fever/Chills   x    Fatigue   x    Muscle Aches   x    Eye Irritation  x  Dry eyes - not wearing contacts as much because of that   Sneezing  x     Nasal Rickie/Drg  x  Rhinitis, postnasal drip   Sinus Pressure/Pain x x     Loss of smell   x    Dental pain   x    Sore Throat   x    Swollen Glands   x    Ear Pain/Fullness   x    Cough   x    Wheeze   x    Chest Pain   x    Shortness of breath   x    Rash   x    Other x x  Headaches, she knows it is allergy related     Symptom duration:  2 weeks   Symptom severity:  moderate   Treatments tried:  Zyzal   Contacts:  None        she normally used to have only fall alleriges " "til last 5 years, now has spring allergies  Last year \"debilitating\", has had allergy testing 2010  Really bad sinus headaches, OTC hasn't worked  Started shots years ago but not sustainable with job and money    She is trying to work holistically too. Has been diagnosed with PCOS, taking liver supplements, milk thistle for 6 weeks      Has tried: claritin D, allegra, benadryl (can't take during day), zyrtec, xyzal  She always gets a little fatigued on medications  Also tried: flonase - helped most of anything, neti pot    Patient Active Problem List   Diagnosis     Atopic rhinitis     Seasonal allergic rhinitis     Conjunctivitis, allergic     Cervical high risk HPV (human papillomavirus) test positive     Depression with anxiety     Past Surgical History:   Procedure Laterality Date     NO HISTORY OF SURGERY         Social History     Tobacco Use     Smoking status: Never Smoker     Smokeless tobacco: Never Used   Substance Use Topics     Alcohol use: Yes     Comment: occassional     Family History   Problem Relation Age of Onset     Hypertension Maternal Grandmother      Breast Cancer Maternal Grandmother      Cerebrovascular Disease Paternal Grandfather      C.A.D. Paternal Grandfather      Allergies Father      Depression Father      Rheumatoid Arthritis Father      Diabetes No family hx of      Cancer - colorectal No family hx of      Prostate Cancer No family hx of            Reviewed and updated as needed this visit by Provider  Tobacco  Allergies  Meds  Problems  Med Hx  Surg Hx  Fam Hx         Review of Systems   Other than noted above, general, HEENT, respiratory, cardiac, MS, and gastrointestinal systems are negative.        Objective   Reported vitals:  There were no vitals taken for this visit.     PSYCH: Alert and oriented times 3; coherent speech, normal   rate and volume, able to articulate logical thoughts, able   to abstract reason, no tangential thoughts, no hallucinations   or delusions  " Her affect is normal  RESP: No cough, no audible wheezing, able to talk in full sentences  Remainder of exam unable to be completed due to telephone visits    Diagnostic Test Results:  Labs reviewed in Epic        Assessment/Plan:  ASSESSMENT/PLAN:      ICD-10-CM    1. Seasonal allergic rhinitis due to pollen  J30.1 desloratadine (CLARINEX) 5 MG tablet     fluticasone (FLONASE) 50 MCG/ACT nasal spray       Patient Instructions   Use the neti pot as needed  Use flonase once daily each nostril  Try the desloratidine  Call if not improving in the next few weeks - consider antihistamine spray  Consider allergy follow up if not improving    Return in about 11 weeks (around 7/13/2020).      Phone call duration:  22 minutes    Fang Suarez PA-C

## 2020-04-27 NOTE — PATIENT INSTRUCTIONS
Use the neti pot as needed  Use flonase once daily each nostril  Try the desloratidine  Call if not improving in the next few weeks - consider antihistamine spray  Consider allergy follow up if not improving

## 2020-08-24 DIAGNOSIS — J30.1 SEASONAL ALLERGIC RHINITIS DUE TO POLLEN: ICD-10-CM

## 2020-08-25 RX ORDER — DESLORATADINE 5 MG/1
5 TABLET ORAL DAILY
Qty: 90 TABLET | Refills: 2 | Status: SHIPPED | OUTPATIENT
Start: 2020-08-25 | End: 2022-06-06

## 2020-08-25 NOTE — TELEPHONE ENCOUNTER
Prescription approved per [facility] Refill Protocol.  Candelario Garcia RN

## 2020-11-29 ENCOUNTER — HEALTH MAINTENANCE LETTER (OUTPATIENT)
Age: 35
End: 2020-11-29

## 2021-04-26 ENCOUNTER — OFFICE VISIT (OUTPATIENT)
Dept: FAMILY MEDICINE | Facility: CLINIC | Age: 36
End: 2021-04-26
Payer: COMMERCIAL

## 2021-04-26 VITALS
BODY MASS INDEX: 35.01 KG/M2 | HEART RATE: 89 BPM | HEIGHT: 61 IN | SYSTOLIC BLOOD PRESSURE: 126 MMHG | RESPIRATION RATE: 16 BRPM | DIASTOLIC BLOOD PRESSURE: 74 MMHG | WEIGHT: 185.4 LBS | OXYGEN SATURATION: 100 % | TEMPERATURE: 99 F

## 2021-04-26 DIAGNOSIS — R87.810 CERVICAL HIGH RISK HPV (HUMAN PAPILLOMAVIRUS) TEST POSITIVE: ICD-10-CM

## 2021-04-26 DIAGNOSIS — F41.8 DEPRESSION WITH ANXIETY: ICD-10-CM

## 2021-04-26 DIAGNOSIS — Z12.4 SCREENING FOR MALIGNANT NEOPLASM OF CERVIX: ICD-10-CM

## 2021-04-26 DIAGNOSIS — Z00.00 ROUTINE GENERAL MEDICAL EXAMINATION AT A HEALTH CARE FACILITY: Primary | ICD-10-CM

## 2021-04-26 DIAGNOSIS — E28.2 PCOS (POLYCYSTIC OVARIAN SYNDROME): ICD-10-CM

## 2021-04-26 DIAGNOSIS — M79.7 FIBROMYALGIA: ICD-10-CM

## 2021-04-26 DIAGNOSIS — Z30.011 OCP (ORAL CONTRACEPTIVE PILLS) INITIATION: ICD-10-CM

## 2021-04-26 PROCEDURE — G0145 SCR C/V CYTO,THINLAYER,RESCR: HCPCS | Performed by: PHYSICIAN ASSISTANT

## 2021-04-26 PROCEDURE — 99395 PREV VISIT EST AGE 18-39: CPT | Performed by: PHYSICIAN ASSISTANT

## 2021-04-26 PROCEDURE — 99213 OFFICE O/P EST LOW 20 MIN: CPT | Mod: 25 | Performed by: PHYSICIAN ASSISTANT

## 2021-04-26 PROCEDURE — 87624 HPV HI-RISK TYP POOLED RSLT: CPT | Performed by: PHYSICIAN ASSISTANT

## 2021-04-26 RX ORDER — NORGESTIMATE AND ETHINYL ESTRADIOL 0.25-0.035
1 KIT ORAL DAILY
Qty: 90 TABLET | Refills: 3 | Status: SHIPPED | OUTPATIENT
Start: 2021-04-26

## 2021-04-26 ASSESSMENT — MIFFLIN-ST. JEOR: SCORE: 1474.96
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
## 2021-04-27 PROBLEM — E28.2 PCOS (POLYCYSTIC OVARIAN SYNDROME): Status: ACTIVE | Noted: 2021-04-27

## 2021-04-27 PROBLEM — M79.7 FIBROMYALGIA: Status: ACTIVE | Noted: 2021-04-27

## 2021-04-28 LAB
COPATH REPORT: NORMAL
PAP: NORMAL

## 2021-04-29 LAB
FINAL DIAGNOSIS: NORMAL
HPV HR 12 DNA CVX QL NAA+PROBE: NEGATIVE
HPV16 DNA SPEC QL NAA+PROBE: NEGATIVE
HPV18 DNA SPEC QL NAA+PROBE: NEGATIVE
SPECIMEN DESCRIPTION: NORMAL
SPECIMEN SOURCE CVX/VAG CYTO: NORMAL

## 2021-07-03 DIAGNOSIS — J30.1 SEASONAL ALLERGIC RHINITIS DUE TO POLLEN: ICD-10-CM

## 2021-07-05 ENCOUNTER — TELEPHONE (OUTPATIENT)
Dept: FAMILY MEDICINE | Facility: CLINIC | Age: 36
End: 2021-07-05

## 2021-07-05 DIAGNOSIS — J30.1 SEASONAL ALLERGIC RHINITIS DUE TO POLLEN: Primary | ICD-10-CM

## 2021-07-05 DIAGNOSIS — J30.1 SEASONAL ALLERGIC RHINITIS DUE TO POLLEN: ICD-10-CM

## 2021-07-05 RX ORDER — FLUTICASONE PROPIONATE 50 MCG
SPRAY, SUSPENSION (ML) NASAL
Qty: 16 ML | Refills: 2 | OUTPATIENT
Start: 2021-07-05

## 2021-07-05 RX ORDER — FLUTICASONE PROPIONATE 50 MCG
SPRAY, SUSPENSION (ML) NASAL
Qty: 16 ML | Refills: 2 | Status: SHIPPED | OUTPATIENT
Start: 2021-07-05 | End: 2022-10-19

## 2021-07-05 NOTE — TELEPHONE ENCOUNTER
Prior Authorization Retail Medication Request    Medication/Dose: Desloratadine  ICD code (if different than what is on RX):  Seasonal allergic rhinitis due to pollen [J30.1]   Previously Tried and Failed:    Rationale:      Covermymeds:    Key: [id number]  Last Name: Xavi  : 1985    Pharmacy Information (if different than what is on RX)  Name:  [facility]  Phone:  [phone]

## 2021-07-05 NOTE — LETTER
[facility]  [address]  [place] [facility] [address]  Phone: [phone]    July 14, 2021        Freda Hurley  [address]  [place] [address]          To whom it may concern:    RE: Freda Meier Xavi    I recommend that patient be given coverage for prescription Clarinex (desloratadine).  She has been taking the medication over 1 year.  She has a long history of allergic rhinitis needing evaluation and treatment by an allergist as well as allergen immunotherapy.  She has tried and failed every over the counter antihistamine including first, second, and third generation antihistamines. This includes failure of xyzal, loratadine, allegra, zyrtec.   She needs a daytime medication that will not cause drowsiness so some of the alternatives are not tolerable.  She has had success for with clarinex and I recommend this is covered by insurance.      Please contact me for questions or concerns.      Sincerely,        Fang Suarez PA-C

## 2021-07-06 NOTE — TELEPHONE ENCOUNTER
PRIOR AUTHORIZATION DENIED    Medication: Desloratadine-DENIED    Denial Date: 7/6/2021    Denial Rational: Patient must have a history of trial & failure to at least 3 of the formulary alternative(s) or have a contraindication or intolerance to the formulary alternatives: carbinoxamine 4mg tab, promethazine tab/soln, cyproheptadine tab/syrup.        Appeal Information:     If provider would like to appeal please provide a letter of medical necessity stating why formulary alternatives would not be clinically appropriate for patient and route back to the PA team.

## 2021-07-06 NOTE — TELEPHONE ENCOUNTER
Central Prior Authorization Team   Phone: [phone]      PA Initiation    Medication: Desloratadine-Initiated  Insurance Company: [employer] Clinical Review - Phone [phone] Fax [phone]  Pharmacy Filling the Rx: [id number] - [place], [place] - [address] & [facility]  Filling Pharmacy Phone: [phone]  Filling Pharmacy Fax:    Start Date: 7/6/2021

## 2021-07-07 ENCOUNTER — TELEPHONE (OUTPATIENT)
Dept: FAMILY MEDICINE | Facility: CLINIC | Age: 36
End: 2021-07-07

## 2021-07-07 NOTE — TELEPHONE ENCOUNTER
Reason for call:  Medication   If this is a refill request, has the caller requested the refill from the pharmacy already? Yes  Will the patient be using a [facility]? No  Name of the pharmacy and phone number for the current request: [facility] - [address] [place] [address], [phone]    Name of the medication requested: fluticasone (FLONASE) 50 MCG/ACT nasal spray    Other request: Freda called requesting a refill of her nasal spray    Phone number to reach patient:  Home number on file [phone] (home)    Best Time:  ANY    Can we leave a detailed message on this number?  YES    Travel screening: Not Applicable

## 2021-07-07 NOTE — TELEPHONE ENCOUNTER
Flonase refilled on 7/5/2021 with refills to [facility] in [place]  Call placed to patient  Relayed patient has refills available    Patient verbalized understanding  No further questions/concerns    Omar Morales RN

## 2021-07-12 NOTE — TELEPHONE ENCOUNTER
Call placed to patient  No answer; generic voicemail left requesting call back to Clinic RN at [phone]    Omar Morales RN

## 2021-07-12 NOTE — TELEPHONE ENCOUNTER
"Received call from patient.  Patient states she has tried all OTC allergy medications and \"3rd generation\" allergy medications.  Has tried Zyzal, Loratadine, Allegra, many others per patient.  Relayed  R Daniela message. Patient reports she has been on Clarinex for 1 year.  Meagan Bullock RN     "

## 2021-07-12 NOTE — TELEPHONE ENCOUNTER
Please see what patient has tried in the past (OTC and prescription) for allergies. And how long has she been on clarinex?  We can try for another prior auth approval if she has tried/failed 3 alternatives.  Irina Suarez PA-C

## 2021-07-15 NOTE — TELEPHONE ENCOUNTER
Medication Appeal Initiation    We have initiated an appeal for the requested medication:  Medication: Desloratadine-APPEAL INITIATED  Appeal Start Date:  7/15/2021  Insurance Company: [employer] Clinical Review - Phone [phone] Fax [phone]  Comments:         Manually faxed letter of medical necessity to [phone].

## 2021-07-20 NOTE — TELEPHONE ENCOUNTER
Called and spoke with Ariane at insurance to check on the status of appeal was transferred to [phone] provider line.  Spoke with Jesse at provider services. He states appeal was denied, he is faxing over the determination.

## 2021-07-21 NOTE — TELEPHONE ENCOUNTER
Per insurance the patient needs to complete the appeal or has to sign paperwork to assign a representative.

## 2021-07-22 NOTE — TELEPHONE ENCOUNTER
Call placed to patient  Relayed Aspen's message    Patient verbalized understanding  Patient wondering if there is an alternative medication she can take instead of Clarinex?    Will route to Aspen to review    Omar Morales RN

## 2021-07-22 NOTE — TELEPHONE ENCOUNTER
Here is the note I just received:  Insurance will not allow the review from the doctor.  Please see documentation.  If done with encounter, please close.     Thanks,   Central PA Team     Please notify patient that her insurance is not allowing us to appeal, she will need to appeal herself (see notes from encounter below).  Irina Suarez PA-C

## 2021-07-23 RX ORDER — AZELASTINE 1 MG/ML
1 SPRAY, METERED NASAL 2 TIMES DAILY
Qty: 30 ML | Refills: 1 | Status: CANCELLED | OUTPATIENT
Start: 2021-07-23

## 2021-07-23 NOTE — TELEPHONE ENCOUNTER
Call placed to patient  Relayed LEAH Suarez's message    Patient states she will attempt some OTC medication in combination with the Flonase     Patient does not want to see an allergist at this time - open to it after attempting some more OTC medications   Patient does not want to pursue appeal to insurance company yet - open to doing that if above does not improve allergy symptoms     Patient appreciative of Aspen's help    Omar Morales RN

## 2021-07-23 NOTE — TELEPHONE ENCOUNTER
I would suggest she tries the appeal as she has tried many things and this is the one that worked. She also could consider seeing the allergist again for further treatment.  Here are non sedating antihistamines, I think she has tried them all but I'm not sure.  Cetirizine (zyrtec)  Levocetirizine (xyzal)  Loratadine (claritin)  Fexofenadine (allegra)    Other antihistamines are potentially sedating and would not work well for in the daytime.  She could try just flonase but I suspect she has already tried that.  Could add antihistamine nasal spray azelastine in addition to flonase if she would like, this could take the place of the oral antihistamine. I pended this order if she is interested.  Irina Suarez PA-C

## 2022-06-26 ENCOUNTER — HEALTH MAINTENANCE LETTER (OUTPATIENT)
Age: 37
End: 2022-06-26

## 2023-03-27 ENCOUNTER — TRANSFERRED RECORDS (OUTPATIENT)
Dept: HEALTH INFORMATION MANAGEMENT | Facility: CLINIC | Age: 38
End: 2023-03-27
Payer: COMMERCIAL

## 2023-04-21 ENCOUNTER — TRANSFERRED RECORDS (OUTPATIENT)
Dept: HEALTH INFORMATION MANAGEMENT | Facility: CLINIC | Age: 38
End: 2023-04-21

## 2023-07-09 ENCOUNTER — HEALTH MAINTENANCE LETTER (OUTPATIENT)
Age: 38
End: 2023-07-09

## 2024-01-08 NOTE — PROGRESS NOTES
"HISTORY AND PHYSICAL UPDATE ADMISSION EXAM    Name: Freda Hurley  YOB: 1985  Medical Record Number: [id number]    History of Present Illness: Freda Hurley is a 38 year old female with infertility presenting for scheduled hysteroscopic myomectomy.            -- Patient previously was seen in the practice for infertility, and underwent 3 cycles of ovulation induction with letrozole, ovidreal, timed intercourse. She is now being seen at [facility]. Underwent SIS as below and surgery was recommended.           -- SIS: Anteverted uterus. Five measurable fibroids noted. Lining 7.4mm and homogenous. Largest (3.6cm) IM fibroid on anterior fundal wall. There is a 7mm SM fibroids on the posterior wall. Cavity with addition of saline demonstrated a 7mm SM fibroidthat impinges in the endometrial cavity, 50% on ED image and 80% on transverse image. Possible hydrosalpinx in right adnexa noted as well.       Past Medical History:   Diagnosis Date    Cervical high risk HPV (human papillomavirus) test positive 2/14/2015, 9/8/16    Neg 16/18    Gastroesophageal reflux disease     Sleep apnea      Past Surgical History:   Procedure Laterality Date    EGD      NO HISTORY OF SURGERY       Exam:      BP (!) 140/80   Pulse 79   Temp 98.9  F (37.2  C) (Temporal)   Resp 16   Ht 1.575 m (5' 2\")   Wt 83 kg (183 lb)   LMP 01/01/2024 (Exact Date)   SpO2 99%   BMI 33.47 kg/m      HEENT grossly normal  Neck: no lymphadenopathy or thryoidomegaly  Lungs CTA b/l  Heart RRR  ABD gravid, non-tender        Assessment: 38 year old female with infertility presenting for scheduled hysteroscopic myomectomy.  Reviewed intraop plan with the patient. Aware that with this route of surgery, SM fibroid would be resected but the small intramural one would be retained. Discussed surgical risks including but not limited to pain, infection, bleeding, damage to surrounding structures; informed consent obtained.  Proceed to OR. "     Mak Su MD   1/10/2024   7:18 AM

## 2024-01-09 ENCOUNTER — ANESTHESIA EVENT (OUTPATIENT)
Dept: SURGERY | Facility: AMBULATORY SURGERY CENTER | Age: 39
End: 2024-01-09

## 2024-01-10 ENCOUNTER — ANESTHESIA (OUTPATIENT)
Dept: SURGERY | Facility: AMBULATORY SURGERY CENTER | Age: 39
End: 2024-01-10

## 2024-01-10 ENCOUNTER — HOSPITAL ENCOUNTER (OUTPATIENT)
Facility: AMBULATORY SURGERY CENTER | Age: 39
Discharge: HOME | End: 2024-01-10
Attending: STUDENT IN AN ORGANIZED HEALTH CARE EDUCATION/TRAINING PROGRAM

## 2024-01-10 VITALS
WEIGHT: 183 LBS | TEMPERATURE: 98.5 F | RESPIRATION RATE: 16 BRPM | OXYGEN SATURATION: 98 % | SYSTOLIC BLOOD PRESSURE: 119 MMHG | HEIGHT: 62 IN | DIASTOLIC BLOOD PRESSURE: 65 MMHG | BODY MASS INDEX: 33.68 KG/M2 | HEART RATE: 92 BPM

## 2024-01-10 DIAGNOSIS — D25.0 FIBROIDS, SUBMUCOSAL: ICD-10-CM

## 2024-01-10 LAB
HCG UR QL: NEGATIVE
INTERNAL QC OK POCT: NORMAL
POCT KIT EXPIRATION DATE: NORMAL
POCT KIT LOT NUMBER: NORMAL

## 2024-01-10 RX ORDER — OXYCODONE HYDROCHLORIDE 5 MG/1
5 TABLET ORAL
Status: DISCONTINUED | OUTPATIENT
Start: 2024-01-10 | End: 2024-01-11 | Stop reason: HOSPADM

## 2024-01-10 RX ORDER — GLYCOPYRROLATE 0.2 MG/ML
INJECTION, SOLUTION INTRAMUSCULAR; INTRAVENOUS PRN
Status: DISCONTINUED | OUTPATIENT
Start: 2024-01-10 | End: 2024-01-10

## 2024-01-10 RX ORDER — ONDANSETRON 2 MG/ML
4 INJECTION INTRAMUSCULAR; INTRAVENOUS EVERY 30 MIN PRN
Status: DISCONTINUED | OUTPATIENT
Start: 2024-01-10 | End: 2024-01-11 | Stop reason: HOSPADM

## 2024-01-10 RX ORDER — ONDANSETRON 2 MG/ML
INJECTION INTRAMUSCULAR; INTRAVENOUS PRN
Status: DISCONTINUED | OUTPATIENT
Start: 2024-01-10 | End: 2024-01-10

## 2024-01-10 RX ORDER — FENTANYL CITRATE 50 UG/ML
INJECTION, SOLUTION INTRAMUSCULAR; INTRAVENOUS PRN
Status: DISCONTINUED | OUTPATIENT
Start: 2024-01-10 | End: 2024-01-10

## 2024-01-10 RX ORDER — ACETAMINOPHEN 325 MG/1
975 TABLET ORAL ONCE
Status: DISCONTINUED | OUTPATIENT
Start: 2024-01-10 | End: 2024-01-11 | Stop reason: HOSPADM

## 2024-01-10 RX ORDER — ACETAMINOPHEN 325 MG/1
975 TABLET ORAL EVERY 6 HOURS PRN
Qty: 50 TABLET | Refills: 0 | Status: SHIPPED | OUTPATIENT
Start: 2024-01-10

## 2024-01-10 RX ORDER — ONDANSETRON 4 MG/1
4 TABLET, ORALLY DISINTEGRATING ORAL EVERY 30 MIN PRN
Status: DISCONTINUED | OUTPATIENT
Start: 2024-01-10 | End: 2024-01-11 | Stop reason: HOSPADM

## 2024-01-10 RX ORDER — IBUPROFEN 800 MG/1
800 TABLET, FILM COATED ORAL ONCE
Status: DISCONTINUED | OUTPATIENT
Start: 2024-01-10 | End: 2024-01-11 | Stop reason: HOSPADM

## 2024-01-10 RX ORDER — KETOROLAC TROMETHAMINE 15 MG/ML
INJECTION, SOLUTION INTRAMUSCULAR; INTRAVENOUS PRN
Status: DISCONTINUED | OUTPATIENT
Start: 2024-01-10 | End: 2024-01-10

## 2024-01-10 RX ORDER — OXYCODONE HYDROCHLORIDE 10 MG/1
10 TABLET ORAL
Status: DISCONTINUED | OUTPATIENT
Start: 2024-01-10 | End: 2024-01-11 | Stop reason: HOSPADM

## 2024-01-10 RX ORDER — IBUPROFEN 800 MG/1
800 TABLET, FILM COATED ORAL EVERY 6 HOURS PRN
Qty: 30 TABLET | Refills: 0 | Status: SHIPPED | OUTPATIENT
Start: 2024-01-10

## 2024-01-10 RX ORDER — LIDOCAINE 40 MG/G
CREAM TOPICAL
Status: DISCONTINUED | OUTPATIENT
Start: 2024-01-10 | End: 2024-01-11 | Stop reason: HOSPADM

## 2024-01-10 RX ORDER — LIDOCAINE HYDROCHLORIDE 20 MG/ML
INJECTION, SOLUTION INFILTRATION; PERINEURAL PRN
Status: DISCONTINUED | OUTPATIENT
Start: 2024-01-10 | End: 2024-01-10

## 2024-01-10 RX ORDER — PROPOFOL 10 MG/ML
INJECTION, EMULSION INTRAVENOUS CONTINUOUS PRN
Status: DISCONTINUED | OUTPATIENT
Start: 2024-01-10 | End: 2024-01-10

## 2024-01-10 RX ORDER — ACETAMINOPHEN 325 MG/1
975 TABLET ORAL ONCE
Status: COMPLETED | OUTPATIENT
Start: 2024-01-10 | End: 2024-01-10

## 2024-01-10 RX ORDER — DEXAMETHASONE SODIUM PHOSPHATE 4 MG/ML
INJECTION, SOLUTION INTRA-ARTICULAR; INTRALESIONAL; INTRAMUSCULAR; INTRAVENOUS; SOFT TISSUE PRN
Status: DISCONTINUED | OUTPATIENT
Start: 2024-01-10 | End: 2024-01-10

## 2024-01-10 RX ORDER — FENTANYL CITRATE 0.05 MG/ML
25 INJECTION, SOLUTION INTRAMUSCULAR; INTRAVENOUS
Status: DISCONTINUED | OUTPATIENT
Start: 2024-01-10 | End: 2024-01-11 | Stop reason: HOSPADM

## 2024-01-10 RX ORDER — SODIUM CHLORIDE, SODIUM LACTATE, POTASSIUM CHLORIDE, CALCIUM CHLORIDE 600; 310; 30; 20 MG/100ML; MG/100ML; MG/100ML; MG/100ML
INJECTION, SOLUTION INTRAVENOUS CONTINUOUS
Status: DISCONTINUED | OUTPATIENT
Start: 2024-01-10 | End: 2024-01-11 | Stop reason: HOSPADM

## 2024-01-10 RX ADMIN — SODIUM CHLORIDE, SODIUM LACTATE, POTASSIUM CHLORIDE, CALCIUM CHLORIDE: 600; 310; 30; 20 INJECTION, SOLUTION INTRAVENOUS at 06:55

## 2024-01-10 RX ADMIN — LIDOCAINE HYDROCHLORIDE 3 ML: 20 INJECTION, SOLUTION INFILTRATION; PERINEURAL at 07:24

## 2024-01-10 RX ADMIN — GLYCOPYRROLATE 0.2 MG: 0.2 INJECTION, SOLUTION INTRAMUSCULAR; INTRAVENOUS at 07:27

## 2024-01-10 RX ADMIN — KETOROLAC TROMETHAMINE 15 MG: 15 INJECTION, SOLUTION INTRAMUSCULAR; INTRAVENOUS at 07:44

## 2024-01-10 RX ADMIN — ACETAMINOPHEN 975 MG: 325 TABLET ORAL at 06:46

## 2024-01-10 RX ADMIN — FENTANYL CITRATE 25 MCG: 50 INJECTION, SOLUTION INTRAMUSCULAR; INTRAVENOUS at 07:38

## 2024-01-10 RX ADMIN — ONDANSETRON 4 MG: 2 INJECTION INTRAMUSCULAR; INTRAVENOUS at 07:26

## 2024-01-10 RX ADMIN — DEXAMETHASONE SODIUM PHOSPHATE 4 MG: 4 INJECTION, SOLUTION INTRA-ARTICULAR; INTRALESIONAL; INTRAMUSCULAR; INTRAVENOUS; SOFT TISSUE at 07:26

## 2024-01-10 RX ADMIN — PROPOFOL 100 MCG/KG/MIN: 10 INJECTION, EMULSION INTRAVENOUS at 07:24

## 2024-01-10 RX ADMIN — FENTANYL CITRATE 50 MCG: 50 INJECTION, SOLUTION INTRAMUSCULAR; INTRAVENOUS at 07:28

## 2024-01-10 NOTE — DISCHARGE INSTRUCTIONS
If you have any questions or concerns regarding your procedure, please contact Dr. Su, her office number is [phone].    [facility] Post Op Pain Medication  Next Dose  Instructions:     _X_ Acetaminophen (Tylenol): Next Dose: ____12:46 pm. Take 650-1,000 mg every 6 hours for mild to moderate pain.     *As an adult, you should not exceed 1,000 mg of acetaminophen (Tylenol) per dose or 4,000 mg per day. If you are taking a narcotic that contains acetaminophen (Tylenol) keep track of your Tylenol dosage.      _X_ Ibuprofen (Motrin, Advil, Aleve, etc.): Next Dose: ____1:44 pm. Take 600-800 mg every 6 hours for mild to moderate pain.     *As an adult, you should not exceed 800 mg of ibuprofen per dose or 3,200 mg per day.     __ Hydrocodone-Tylenol (Norco): Next Dose: _______.  Take ____ tab(s) every ___ hours for moderate to severe pain.     __ Oxycodone: Next Dose: _______.  Take ___ tab(s) every ___ hours for moderate to severe pain.     __ Oxycodone-Tylenol (Percocet): Next Dose: _______.  Take ___ tab(s) every ___ hours for moderate to severe pain.     __ Tramadol: Next Dose: _______. Take 1 tablet every 6 hours as needed for moderate to severe pain.       *If you are prescribed narcotic pain medications (Oxycodone, Norco, Percocet, Tylenol #3, Dilaudid, etc.) then you should try to wean off them as tolerated. These are AS NEEDED medications, so if you are not having significant pain you should try to take fewer pills at a time or spread the doses out over a longer period of time than is written on the prescription. As an example, if you are prescribed 1-2 pills of pain medication every 4 hours AS NEEDED for pain, then you should begin taking only 1 pill every 4 hours as your pain tolerates. Then when 1 pill is giving good pain relief you should try to spread the doses out longer than 4 hours apart as you can tolerate it.     Discharge Instructions: After Your Surgery, regarding  anesthesia    You ve just had surgery. During surgery, you were given medicine called anesthesia to keep you relaxed and free of pain. After surgery, you may have some pain or nausea. This is common. Here are some tips for feeling better and getting well after surgery.    Going home    Your healthcare provider will show you how to take care of yourself when you go home. He or she will also answer your questions. Have an adult family member or friend drive you home. For the first 24 hours after your surgery:    Don't drive or use heavy equipment.  Don't make important decisions or sign legal papers.  Don't drink alcohol.  Have an adult stay with you for the next 24 hours. He or she can watch for problems and help keep you safe.  Be sure to go to all follow-up visits with your healthcare provider. And rest after your surgery for as long as your healthcare provider tells you to.    Managing nausea    Some people have an upset stomach after surgery. This is often because of anesthesia, pain, or pain medicine, or the stress of surgery. These tips will help you handle nausea and eat healthy foods as you get better. If you were on a special food plan before surgery, ask your healthcare provider if you should follow it while you get better. These tips may help:    Don't push yourself to eat. Your body will tell you when to eat and how much.  Start off with clear liquids and soup. They are easier to digest.  Next try semi-solid foods, such as mashed potatoes, applesauce, and gelatin, as you feel ready.  Slowly move to solid foods. Don t eat fatty, rich, or spicy foods at first.  Don't force yourself to have 3 large meals a day. Instead eat smaller amounts more often.  Take pain medicines with a small amount of solid food, such as crackers or toast, to prevent nausea.    If you have obstructive sleep apnea    You were given anesthesia medicine during surgery to keep you comfortable and free of pain. After surgery, you may  have more apnea spells because of this medicine and other medicines you were given. The spells may last longer than usual.   At home:    Keep using the continuous positive airway pressure (CPAP) device when you sleep. Unless your healthcare provider tells you not to, use it when you sleep, day or night. CPAP is a common device used to treat obstructive sleep apnea.  Talk with your provider before taking any pain medicine, muscle relaxants, or sedatives. Your provider will tell you about the possible dangers of taking these medicines.

## 2024-01-10 NOTE — ANESTHESIA CARE TRANSFER NOTE
Patient: Freda Hurley    Procedure: Procedure(s):  HYSTEROSCOPY, MYOMECTOMY       Diagnosis: Fibroids, submucosal [D25.0]  Diagnosis Additional Information: No value filed.    Anesthesia Type:   MAC     Note:    Oropharynx: oropharynx clear of all foreign objects  Level of Consciousness: awake and drowsy  Oxygen Supplementation: face mask  Level of Supplemental Oxygen (L/min / FiO2): 8  Independent Airway: airway patency satisfactory and stable  Dentition: dentition unchanged  Vital Signs Stable: post-procedure vital signs reviewed and stable  Report to RN Given: handoff report given  Patient transferred to: Phase II    Handoff Report: Identifed the Patient, Identified the Reponsible Provider, Reviewed the pertinent medical history, Discussed the surgical course, Reviewed Intra-OP anesthesia mangement and issues during anesthesia, Set expectations for post-procedure period and Allowed opportunity for questions and acknowledgement of understanding      Vitals:  Vitals Value Taken Time   /69 01/10/24 0754   Temp 98.5  F (36.9  C) 01/10/24 0754   Pulse 120 01/10/24 0753   Resp 16 01/10/24 0754   SpO2 97 % 01/10/24 0754   Vitals shown include unfiled device data.    Electronically Signed By: DARRON Puente CRNA  January 10, 2024  7:56 AM

## 2024-01-10 NOTE — ANESTHESIA POSTPROCEDURE EVALUATION
Patient: Freda Hurley    Procedure: Procedure(s):  HYSTEROSCOPY, MYOMECTOMY       Anesthesia Type:  MAC    Note:  Disposition: Outpatient   Postop Pain Control: Uneventful            Sign Out: Well controlled pain   PONV: No   Neuro/Psych: Uneventful            Sign Out: Acceptable/Baseline neuro status   Airway/Respiratory: Uneventful            Sign Out: Acceptable/Baseline resp. status   CV/Hemodynamics: Uneventful            Sign Out: Acceptable CV status; No obvious hypovolemia; No obvious fluid overload   Other NRE: NONE   DID A NON-ROUTINE EVENT OCCUR? No           Last vitals:  Vitals Value Taken Time   /65 01/10/24 0830   Temp 98.5  F (36.9  C) 01/10/24 0754   Pulse 88 01/10/24 0842   Resp 16 01/10/24 0813   SpO2 100 % 01/10/24 0842   Vitals shown include unfiled device data.    Electronically Signed By: Duncan Lara MD  January 10, 2024  9:36 AM

## 2024-01-10 NOTE — OP NOTE
Operative Report    Patient: Freda Hurley    MRN: [id number]    CSN: [id number]    1/10/2024    Procedure date: 1/10/2024    Preoperative Diagnosis:    1. Submucosal myomectomy and infertility    Postoperative Diagnosis: Same    Procedure(s): Procedure(s):  HYSTEROSCOPY, MYOMECTOMY    Attending Surgeon: Mak Su MD    Assistant(s): Circulator: Beth Tirado RN  Scrub Person: Robert Lackey    Anesthesia: MAC    EBL: 10mL    Fluids: see anesthesia record     UOP: n/a     Fluid deficit: 200ml    Description of findings:  Hysteroscopy demonstrated normal ostia bilaterally, normal appearing endometrium with 1cm posterior type 0 myoma     Specimens submitted:  ID Type Source Tests Collected by Time Destination   1 :  Tissue Fibroid SURGICAL PATHOLOGY EXAM Mak Su MD 1/10/2024  7:47 AM        Disposition:stable to PACU    Complications: none    Description of Operation:    The patient was explained the procedure. All risks, benefits, alternatives were discussed. The consent form was signed with a witness present.    The patient was taken to the OR where SCDs were placed and turned on. MAC was induced without difficulty. The patient was then placed in dorsal lithotomy position using Kalin stirrups. She was examined for the above noted findings. Then, the patient was prepped and draped in usual sterile fashion.    A bivalve speculum was used to identify the cervix. Singled toothed tenaculum was placed on the anterior lip of the cervix.  The cervix was dilated to accomodate an operative hysteroscope sequentially using Carolyn dilators. An operative hysteroscope was carefully introduced through the cervix to the uterus under direct visualization. Sterile saline was used a medium for visualization of the uterine cavity. Both ostia were visualized and appeared normal. A 1cm submucosal fibroid was noted on posterior wall, and was resected in fragments using the Myosure hysteroscopic  morcellator.  No bleeding was noted from the surgical site or other areas of the uterine cavity. Next the tenaculum was removed from the cervix and the puncture sites were made hemostatic with silver nitrite. All instruments were then removed from the vagina.    Ins and outs were noted. All needle, instrument, and lap sponge count correct x 2. The procedure was overall without complication. The patient was repositioned to dorsal supine position, awakened without event and taken to the recovery room in stable condition.    Mak Su MD

## 2024-09-01 ENCOUNTER — HEALTH MAINTENANCE LETTER (OUTPATIENT)
Age: 39
End: 2024-09-01